# Patient Record
Sex: FEMALE
[De-identification: names, ages, dates, MRNs, and addresses within clinical notes are randomized per-mention and may not be internally consistent; named-entity substitution may affect disease eponyms.]

---

## 2019-09-12 ENCOUNTER — HOSPITAL ENCOUNTER (EMERGENCY)
Dept: HOSPITAL 5 - ED | Age: 34
LOS: 1 days | Discharge: HOME | End: 2019-09-13
Payer: SELF-PAY

## 2019-09-12 DIAGNOSIS — S63.502A: Primary | ICD-10-CM
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
## 2019-09-12 DIAGNOSIS — F12.10: ICD-10-CM
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
## 2019-09-12 DIAGNOSIS — Z98.890: ICD-10-CM

## 2019-09-12 DIAGNOSIS — S50.12XA: ICD-10-CM

## 2019-09-12 DIAGNOSIS — F14.10: ICD-10-CM

## 2019-09-12 DIAGNOSIS — F17.200: ICD-10-CM
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
## 2019-09-13 VITALS — DIASTOLIC BLOOD PRESSURE: 76 MMHG | SYSTOLIC BLOOD PRESSURE: 118 MMHG

## 2019-09-13 NOTE — EMERGENCY DEPARTMENT REPORT
ED Fall HPI





- General


Chief Complaint: Extremity Injury, Upper


Stated Complaint: LEFT WRIST PAIN


Source: patient


Mode of arrival: Ambulatory





- History of Present Illness


Initial Comments: 





Patient is a 34-year-old white female with no past medical history presents to 

the ED with component of acute onset persistent severe left wrist pain after she

tripped the house and fell down landing on the left wrist about 4 hours ago.  

Patient states that pain is worse with any active range of motion of the left 

wrist.  Patient denies head or neck injury, chest pain, shortness of breath, 

numbness and tingling of left arm, abdominal pain, low back pain, dizziness, 

syncope or seizures.


MD Complaint: fall, other (left wrist pain)


-: Sudden, hour(s) (4)


Fall From: standing, other (slipped and fell down on floor)


When Fall Occurred: 1-3 hours PTA


Fall Witnessed: yes, by bystander


Place Fall Occurred: home


Loss of Consciousness: none


Prolonged Down Time?: no


Symptoms Prior to Fall: none, other (tripped)


Location: other (left wrist)


Location - Extremities: Left: Forearm (left forearm), Hand (left wrist)


Severity: severe


Severity scale (0 -10): 6


Quality: sharp, aching


Context: tripped/slipped


Associated Symptoms: denies: headache, neck pain, numbness, chest paint, 

shortness of breath, abdominal pain, hematuria, unable to walk, lightheaded, 

vertigo, confusion, other





- Related Data


                                  Previous Rx's











 Medication  Instructions  Recorded  Last Taken  Type


 


Cyclobenzaprine HCl [Flexeril 5 MG 5 mg PO Q8H PRN #15 tab 09/13/19 Unknown Rx





TAB]    


 


Naproxen [Naprosyn TAB] 375 mg PO Q12H PRN #20 tablet 09/13/19 Unknown Rx














ED Review of Systems


ROS: 


Stated complaint: LEFT WRIST PAIN


Other details as noted in HPI





Constitutional: denies: chills, fever


Eyes: denies: eye pain, eye discharge, vision change


ENT: denies: ear pain, throat pain


Respiratory: denies: cough, shortness of breath, wheezing


Cardiovascular: denies: chest pain, palpitations


Endocrine: no symptoms reported


Gastrointestinal: denies: abdominal pain, nausea, diarrhea


Genitourinary: denies: urgency, dysuria, discharge


Musculoskeletal: arthralgia (LEFT WRIST).  denies: back pain, joint swelling


Skin: denies: rash, lesions


Neurological: denies: headache, weakness, paresthesias


Psychiatric: denies: anxiety, depression


Hematological/Lymphatic: denies: easy bleeding, easy bruising





ED Past Medical Hx





- Past Medical History


Previous Medical History?: No





- Surgical History


Past Surgical History?: Yes


Additional Surgical History: c-Sec X2





- Social History


Smoking Status: Current Every Day Smoker


Substance Use Type: Cocaine, Marijuana





- Medications


Home Medications: 


                                Home Medications











 Medication  Instructions  Recorded  Confirmed  Last Taken  Type


 


Cyclobenzaprine HCl [Flexeril 5 MG 5 mg PO Q8H PRN #15 tab 09/13/19  Unknown Rx





TAB]     


 


Naproxen [Naprosyn TAB] 375 mg PO Q12H PRN #20 tablet 09/13/19  Unknown Rx














ED Physical Exam





- General


Limitations: No Limitations


General appearance: alert, in no apparent distress





- Head


Head exam: Present: atraumatic, normocephalic, normal inspection





- Eye


Eye exam: Present: normal appearance, PERRL, EOMI


Pupils: Present: normal accommodation





- ENT


ENT exam: Present: normal exam, normal orophraynx, mucous membranes moist, TM's 

normal bilaterally, normal external ear exam





- Neck


Neck exam: Present: normal inspection, full ROM





- Respiratory


Respiratory exam: Present: normal lung sounds bilaterally.  Absent: respiratory 

distress, wheezes, rales, rhonchi, chest wall tenderness, decreased breath 

sounds





- Cardiovascular


Cardiovascular Exam: Present: regular rate, normal rhythm, normal heart sounds. 

Absent: systolic murmur, diastolic murmur, rubs, gallop





- GI/Abdominal


GI/Abdominal exam: Present: soft, normal bowel sounds.  Absent: distended, ten

derness, guarding, rebound, hyperactive bowel sounds, hypoactive bowel sounds, 

organomegaly





- Extremities Exam


Extremities exam: Present: normal inspection, full ROM, tenderness (Left wrist 

tenderness), normal capillary refill





- Back Exam


Back exam: Present: normal inspection, full ROM.  Absent: tenderness, CVA 

tenderness (R), CVA tenderness (L), muscle spasm, paraspinal tenderness





- Neurological Exam


Neurological exam: Present: alert, oriented X3, CN II-XII intact, normal gait, 

reflexes normal





- Psychiatric


Psychiatric exam: Present: normal affect, normal mood





- Skin


Skin exam: Present: warm, dry, intact, normal color.  Absent: rash





ED Course





                                   Vital Signs











  09/13/19





  00:03


 


Temperature 98.2 F


 


Pulse Rate 91 H


 


Respiratory 16





Rate 


 


Blood Pressure 105/64


 


O2 Sat by Pulse 98





Oximetry 














- Reevaluation(s)


Reevaluation #1: 





09/13/19 04:48


This is a 34-year-old female who presented to the ED with left wrist pain after 

she tripped and fell down on the floor about 4 hours ago.  In the ED, patient is

alert and oriented 3 and is not in distress but appears to be in pain.  Vital 

signs are stable.  Left wrist x-ray shows no acute fractures or subluxations.  

Patient was treated for pain and the left wrist splinted with a Velcro splint.  

Patient was discharged home on pain medications and muscle relaxants and advised

to follow-up with her primary care physician in 5-7 days for reevaluation.  

Patient was also advised to return to the ED immediately if symptoms get worse.





ED Medical Decision Making





- Radiology Data





Left wrist x-ray shows no acute fractures or subluxations.





- Medical Decision Making





This is a 34-year-old female who presented to the ED with left wrist pain after 

she tripped and fell down on the floor about 4 hours ago.  In the ED, patient is

alert and oriented 3 and is not in distress but appears to be in pain.  Vital 

signs are stable.  Left wrist x-ray shows no acute fractures or subluxations.  

Patient was treated for pain and the left wrist splinted with a Velcro splint.  

Patient was discharged home on pain medications and muscle relaxants and advised

to follow-up with her primary care physician in 5-7 days for reevaluation.  

Patient was also advised to return to the ED immediately if symptoms get worse.





- Differential Diagnosis


wrist fracture; lwrist sprain; forearm contusion


Critical care attestation.: 


If time is entered above; I have spent that time in minutes in the direct care 

of this critically ill patient, excluding procedure time.








ED Disposition


Clinical Impression: 


Sprain of wrist, left


Qualifiers:


 Encounter type: initial encounter Qualified Code(s): S63.502A - Unspecified 

sprain of left wrist, initial encounter





Contusion of left forearm


Qualifiers:


 Encounter type: initial encounter Qualified Code(s): S50.12XA - Contusion of 

left forearm, initial encounter





Disposition: DC-01 TO HOME OR SELFCARE


Is pt being admited?: No


Does the pt Need Aspirin: No


Condition: Stable


Instructions:  Wrist Injury (ED), Wrist Sprain (ED), Muscle Strain (ED)


Additional Instructions: 


Take medications with food, drink plenty of fluids and follow up with your 

Primary care Physician in 5-7 days for further reevaluation. Return to the ED 

immediately if symptoms get worse.


Prescriptions: 


Cyclobenzaprine HCl [Flexeril 5 MG TAB] 5 mg PO Q8H PRN #15 tab


 PRN Reason: Muscle Spasm


Naproxen [Naprosyn TAB] 375 mg PO Q12H PRN #20 tablet


 PRN Reason: Pain , Severe (7-10)


Referrals: 


PRIMARY CARE,MD [Primary Care Provider] - 3-5 Days


Time of Disposition: 04:25


Print Language: ENGLISH

## 2019-09-16 NOTE — XRAY REPORT
Left wrist, 4 views



INDICATION:  fall - left wrist pain. 



COMPARISON: None.



IMPRESSION:  No acute osseous or soft tissue abnormality.    No significant DJD.



Signer Name: Gabo Ghotra Jr, MD 

Signed: 9/16/2019 2:16 PM

 Workstation Name: [id number]

## 2019-10-01 ENCOUNTER — HOSPITAL ENCOUNTER (EMERGENCY)
Dept: HOSPITAL 5 - ED | Age: 34
Discharge: LEFT BEFORE DISCHARGE | End: 2019-10-01
Payer: SELF-PAY

## 2019-10-01 VITALS — DIASTOLIC BLOOD PRESSURE: 74 MMHG | SYSTOLIC BLOOD PRESSURE: 103 MMHG

## 2019-10-01 DIAGNOSIS — K62.89: Primary | ICD-10-CM

## 2019-10-01 DIAGNOSIS — Z53.21: ICD-10-CM

## 2019-10-01 NOTE — EVENT NOTE
ED Screening Note


Date of service: 10/01/19


Time: 21:53


ED Screening Note: 


34 y o f presents to Ed cc of rectal pain x 1 hour ago


sharp pain intermittent.


no blood in stool





This initial assessment/diagnostic orders/clinical plan/treatment(s) is/are 

subject to change based on patients health status, clinical progression and re-

assessment by fellow clinical providers in the ED. Further treatment and workup 

at subsequent clinical providers discretion. Patient/guardian urged not to elope

from the ED as their condition may be serious if not clinically assessed and 

managed. 





Initial orders include: 


acc eval

## 2019-10-02 ENCOUNTER — HOSPITAL ENCOUNTER (EMERGENCY)
Dept: HOSPITAL 5 - ED | Age: 34
Discharge: HOME | End: 2019-10-02
Payer: SELF-PAY

## 2019-10-02 VITALS — DIASTOLIC BLOOD PRESSURE: 77 MMHG | SYSTOLIC BLOOD PRESSURE: 109 MMHG

## 2019-10-02 DIAGNOSIS — F17.200: ICD-10-CM

## 2019-10-02 DIAGNOSIS — F12.10: ICD-10-CM

## 2019-10-02 DIAGNOSIS — F14.10: ICD-10-CM

## 2019-10-02 DIAGNOSIS — K59.00: Primary | ICD-10-CM

## 2019-10-02 PROCEDURE — 99281 EMR DPT VST MAYX REQ PHY/QHP: CPT

## 2019-10-02 NOTE — EMERGENCY DEPARTMENT REPORT
ED General Adult HPI





- General


Chief complaint: Rectal Pain


Stated complaint: COLON/RECTAL PAIN


Time Seen by Provider: 10/02/19 18:54


Source: patient


Mode of arrival: Ambulatory


Limitations: No Limitations





- History of Present Illness


Initial comments: 





Patient is a 34-year-old white female with no past medical history presents to 

the ED with complaint of acute onset persistent severe rectal pain for the last 

2 days.  Patient admits to having rectal intercourse with no protection, the 

last episode of which was 3 days ago.  Patient denies vaginal bleeding, 

hematochezia, dysuria, dizziness, fever, chills, abdominal pain, nausea and 

vomiting.


MD Complaint: Rectal pain


-: Sudden, days(s) (3)


Location: abdomen (rectum)


Radiation: non-radiation


Severity scale (0 -10): 1


Quality: burning, aching, sharp


Consistency: intermittent


Improves with: none


Worsens with: none


Associated Symptoms: denies other symptoms.  denies: confusion, chest pain, 

cough, diaphoresis, fever/chills, headaches, loss of appetite, nausea/vomiting, 

shortness of breath, weakness


Treatments Prior to Arrival: none





- Related Data


                                  Previous Rx's











 Medication  Instructions  Recorded  Last Taken  Type


 


Cyclobenzaprine HCl [Flexeril 5 MG 5 mg PO Q8H PRN #15 tab 09/13/19 Unknown Rx





TAB]    


 


Naproxen [Naprosyn TAB] 375 mg PO Q12H PRN #20 tablet 09/13/19 Unknown Rx


 


Docusate Sodium [Colace CAP] 100 mg PO BID PRN #30 capsule 10/02/19 Unknown Rx


 


Ibuprofen [Motrin] 400 mg PO Q8H PRN #20 tablet 10/02/19 Unknown Rx











                                    Allergies











Allergy/AdvReac Type Severity Reaction Status Date / Time


 


No Known Allergies Allergy   Unverified 10/01/19 21:54














ED Review of Systems


ROS: 


Stated complaint: COLON/RECTAL PAIN


Other details as noted in HPI





Constitutional: denies: chills, fever


Eyes: denies: eye pain, eye discharge, vision change


ENT: denies: ear pain, throat pain


Respiratory: denies: cough, shortness of breath, wheezing


Cardiovascular: denies: chest pain, palpitations


Endocrine: no symptoms reported


Gastrointestinal: other (RECTAL PAIN).  denies: abdominal pain, nausea, 

vomiting, diarrhea, hematemesis


Genitourinary: denies: urgency, dysuria, discharge


Musculoskeletal: denies: back pain, joint swelling, arthralgia


Skin: denies: rash, lesions


Neurological: denies: headache, weakness, paresthesias


Psychiatric: denies: anxiety, depression


Hematological/Lymphatic: denies: easy bleeding, easy bruising





ED Past Medical Hx





- Past Medical History


Previous Medical History?: No





- Surgical History


Additional Surgical History: c-Sec X2





- Social History


Smoking Status: Current Every Day Smoker


Substance Use Type: Alcohol, Cocaine, Marijuana, Methamphetamines





- Medications


Home Medications: 


                                Home Medications











 Medication  Instructions  Recorded  Confirmed  Last Taken  Type


 


Cyclobenzaprine HCl [Flexeril 5 MG 5 mg PO Q8H PRN #15 tab 09/13/19  Unknown Rx





TAB]     


 


Naproxen [Naprosyn TAB] 375 mg PO Q12H PRN #20 tablet 09/13/19  Unknown Rx


 


Docusate Sodium [Colace CAP] 100 mg PO BID PRN #30 capsule 10/02/19  Unknown Rx


 


Ibuprofen [Motrin] 400 mg PO Q8H PRN #20 tablet 10/02/19  Unknown Rx














ED Physical Exam





- General


Limitations: No Limitations


General appearance: alert, in no apparent distress





- Head


Head exam: Present: atraumatic, normocephalic, normal inspection





- Eye


Eye exam: Present: normal appearance, PERRL, EOMI.  Absent: scleral icterus, 

conjunctival injection, nystagmus, periorbital swelling, periorbital tenderness


Pupils: Present: normal accommodation





- ENT


ENT exam: Present: normal exam, normal orophraynx, mucous membranes moist, TM's 

normal bilaterally, normal external ear exam





- Neck


Neck exam: Present: normal inspection, full ROM





- Respiratory


Respiratory exam: Present: normal lung sounds bilaterally.  Absent: respiratory 

distress, wheezes, rhonchi, stridor, chest wall tenderness, decreased breath 

sounds





- Cardiovascular


Cardiovascular Exam: Present: regular rate, normal rhythm, normal heart sounds. 

 Absent: systolic murmur, diastolic murmur, rubs, gallop





- GI/Abdominal


GI/Abdominal exam: Present: soft, normal bowel sounds.  Absent: tenderness, 

guarding, hyperactive bowel sounds, organomegaly, mass





- Rectal


Rectal exam: Present: normal inspection, normal rectal tone, fecal impaction.  

Absent: hemorrhoids, tenderness





- Extremities Exam


Extremities exam: Present: normal inspection, full ROM, normal capillary refill





- Back Exam


Back exam: Present: normal inspection, full ROM.  Absent: tenderness, CVA 

tenderness (R), CVA tenderness (L), muscle spasm, paraspinal tenderness





- Neurological Exam


Neurological exam: Present: alert, oriented X3, CN II-XII intact, normal gait, 

reflexes normal





- Psychiatric


Psychiatric exam: Present: normal affect, normal mood





- Skin


Skin exam: Present: warm, dry, intact, normal color.  Absent: rash





ED Course





                                   Vital Signs











  10/02/19





  18:54


 


Temperature 98 F


 


Pulse Rate 68


 


Respiratory 18





Rate 


 


Blood Pressure 109/77


 


O2 Sat by Pulse 98





Oximetry 














- Reevaluation(s)


Reevaluation #1: 





10/02/19 20:49


This is a 34-year-old white female with no past medical history who presented to

 the ED with acute onset persistent rectal pain for 2 days.  Patient is alert 

and oriented 3 and is not in distress.  Patient was discharged home on 

ibuprofen and Colace to treat rectal pain and constipation respectively.  

Patient was advised to follow-up with her primary care physician in 7-10 days 

for reevaluation or return to the ED immediately if symptoms get worse.





ED Medical Decision Making





- Medical Decision Making





This is a 34-year-old white female with no past medical history who presented to

 the ED with acute onset persistent rectal pain for 2 days.  Patient is alert 

and oriented 3 and is not in distress.  Patient was discharged home on 

ibuprofen and Colace to treat rectal pain and constipation respectively.  

Patient was advised to follow-up with her primary care physician in 7-10 days fo

r reevaluation or return to the ED immediately if symptoms get worse.





- Differential Diagnosis


RECTAL PAIN; HEMORRHOIDS; CONSTIPATION; RECTAL TEAR


Critical care attestation.: 


If time is entered above; I have spent that time in minutes in the direct care 

of this critically ill patient, excluding procedure time.








ED Disposition


Clinical Impression: 


 Anal or rectal pain





Constipation


Qualifiers:


 Constipation type: unspecified constipation type Qualified Code(s): K59.00 - 

Constipation, unspecified





Disposition: DC-01 TO HOME OR SELFCARE


Is pt being admited?: No


Does the pt Need Aspirin: No


Condition: Stable


Instructions:  Constipation (ED)


Additional Instructions: 


Drink plenty of fluids and follow-up with your primary care physician in 7-10 

days for reevaluation.  Return to the ED immediately if symptoms worse.


Prescriptions: 


Docusate Sodium [Colace CAP] 100 mg PO BID PRN #30 capsule


 PRN Reason: Constipation


Ibuprofen [Motrin] 400 mg PO Q8H PRN #20 tablet


 PRN Reason: Pain , Severe (7-10)


Referrals: 


PRIMARY CARE,MD [Primary Care Provider] - 3-5 Days


Time of Disposition: 20:46


Print Language: ENGLISH

## 2019-10-02 NOTE — EVENT NOTE
ED Screening Note


Date of service: 10/02/19


Time: 18:54


ED Screening Note: 


34 y o female presents to ED  cc of rectal pain


pt was here yesterday but left due to the wait time without being seen





This initial assessment/diagnostic orders/clinical plan/treatment(s) is/are 

subject to change based on patients health status, clinical progression and re-

assessment by fellow clinical providers in the ED. Further treatment and workup 

at subsequent clinical providers discretion. Patient/guardian urged not to elope

from the ED as their condition may be serious if not clinically assessed and 

managed. 





Initial orders include: 


acc eval

## 2021-09-11 ENCOUNTER — HOSPITAL ENCOUNTER (EMERGENCY)
Dept: HOSPITAL 5 - ED | Age: 36
Discharge: HOME | End: 2021-09-11
Payer: SELF-PAY

## 2021-09-11 VITALS — SYSTOLIC BLOOD PRESSURE: 131 MMHG | DIASTOLIC BLOOD PRESSURE: 94 MMHG

## 2021-09-11 DIAGNOSIS — Z98.890: ICD-10-CM

## 2021-09-11 DIAGNOSIS — F12.90: ICD-10-CM

## 2021-09-11 DIAGNOSIS — U07.1: Primary | ICD-10-CM

## 2021-09-11 DIAGNOSIS — F14.90: ICD-10-CM

## 2021-09-11 DIAGNOSIS — F15.90: ICD-10-CM

## 2021-09-11 DIAGNOSIS — Z79.899: ICD-10-CM

## 2021-09-11 DIAGNOSIS — F17.200: ICD-10-CM

## 2021-09-11 DIAGNOSIS — R06.02: ICD-10-CM

## 2021-09-11 LAB
ALBUMIN SERPL-MCNC: 3.5 G/DL (ref 3.9–5)
ALT SERPL-CCNC: 10 UNITS/L (ref 7–56)
BASOPHILS # (AUTO): 0 K/MM3 (ref 0–0.1)
BASOPHILS NFR BLD AUTO: 0.3 % (ref 0–1.8)
BUN SERPL-MCNC: 7 MG/DL (ref 7–17)
BUN/CREAT SERPL: 18 %
CALCIUM SERPL-MCNC: 8.5 MG/DL (ref 8.4–10.2)
EOSINOPHIL # BLD AUTO: 0.1 K/MM3 (ref 0–0.4)
EOSINOPHIL NFR BLD AUTO: 1.2 % (ref 0–4.3)
HCT VFR BLD CALC: 37.1 % (ref 30.3–42.9)
HEMOLYSIS INDEX: 6
HGB BLD-MCNC: 13 GM/DL (ref 10.1–14.3)
LYMPHOCYTES # BLD AUTO: 1.6 K/MM3 (ref 1.2–5.4)
LYMPHOCYTES NFR BLD AUTO: 27.9 % (ref 13.4–35)
MCHC RBC AUTO-ENTMCNC: 35 % (ref 30–34)
MCV RBC AUTO: 93 FL (ref 79–97)
MONOCYTES # (AUTO): 0.4 K/MM3 (ref 0–0.8)
MONOCYTES % (AUTO): 7.4 % (ref 0–7.3)
PLATELET # BLD: 260 K/MM3 (ref 140–440)
RBC # BLD AUTO: 3.98 M/MM3 (ref 3.65–5.03)

## 2021-09-11 PROCEDURE — 85379 FIBRIN DEGRADATION QUANT: CPT

## 2021-09-11 PROCEDURE — 84703 CHORIONIC GONADOTROPIN ASSAY: CPT

## 2021-09-11 PROCEDURE — 80053 COMPREHEN METABOLIC PANEL: CPT

## 2021-09-11 PROCEDURE — 85025 COMPLETE CBC W/AUTO DIFF WBC: CPT

## 2021-09-11 PROCEDURE — 36415 COLL VENOUS BLD VENIPUNCTURE: CPT

## 2021-09-11 PROCEDURE — 93005 ELECTROCARDIOGRAM TRACING: CPT

## 2021-09-11 PROCEDURE — 84484 ASSAY OF TROPONIN QUANT: CPT

## 2021-09-11 PROCEDURE — 71275 CT ANGIOGRAPHY CHEST: CPT

## 2021-09-11 PROCEDURE — 99284 EMERGENCY DEPT VISIT MOD MDM: CPT

## 2021-09-11 PROCEDURE — 83880 ASSAY OF NATRIURETIC PEPTIDE: CPT

## 2021-09-11 PROCEDURE — 71046 X-RAY EXAM CHEST 2 VIEWS: CPT

## 2021-09-11 NOTE — CAT SCAN REPORT
CTA CHEST WITH IV CONTRAST



INDICATION / CLINICAL INFORMATION:

SOB, elevated d-dimer.



TECHNIQUE:

Axial CT images were obtained through the chest after injection of 75 IV contrast. 3 plane MIP and/or
 3D reconstructions were produced. All CT scans at this location are performed using CT dose reductio
n for ALARA by means of automated exposure control. 



COMPARISON:

Chest radiograph earlier today.



FINDINGS:

PULMONARY ARTERIES: No pulmonary emboli.

THORACIC AORTA: No significant abnormality. 

HEART: No significant abnormality.

CORONARY ARTERIES: No significant calcification.

PLEURA: No pleural effusion. No pneumothorax.

LYMPH NODES: No significant adenopathy.

LUNGS: No acute air space or interstitial disease. 



ADDITIONAL FINDINGS: None.



UPPER ABDOMEN: No acute findings.



SKELETAL STRUCTURES: No significant osseous abnormality.



IMPRESSION:

1. No CT evidence for pulmonary embolism. 

2. No acute findings.



Signer Name: Demetrice Malave MD 

Signed: 9/11/2021 1:41 PM

Workstation Name: [id number]

## 2021-09-11 NOTE — XRAY REPORT
CHEST 2 VIEWS 



INDICATION / CLINICAL INFORMATION:

SOB.



COMPARISON: 

None available.



FINDINGS:



SUPPORT DEVICES: None.



HEART / MEDIASTINUM: No significant abnormality. 



LUNGS / PLEURA: No significant pulmonary or pleural abnormality. No pneumothorax. 



ADDITIONAL FINDINGS: No significant additional findings.



IMPRESSION:

1. No acute findings.



Signer Name: Demetrice Malave MD 

Signed: 9/11/2021 11:57 AM

Workstation Name: [id number]

## 2021-09-11 NOTE — EMERGENCY DEPARTMENT REPORT
ED General Adult HPI





- General


Chief complaint: Extremity Injury, Upper


Stated complaint: SOB DIZZINESS NUMBNESS


Time Seen by Provider: 09/11/21 10:11


Source: patient


Mode of arrival: Ambulatory


Limitations: No Limitations





- History of Present Illness


Initial comments: 





Patient is a 36-year-old female presents emergency room with complaints of 

shortness of breath that initially began a couple days ago but has worsened 

since last night.  Patient states that she had a fall and someone fell on her 

arm a couple days ago and she had a fracture of her humerus and was seen at 

[facility] at that time and placed in a sling and given orthopedic follow-up

.  She states that she has not yet seen orthopedic doctor.  She states that she 

was also having URI symptoms at that time and they tested her at [facility] 

and she was positive for COVID-19.  She states her symptoms have been 

lightheadedness, shortness of breath, cough, diarrhea, subjective fever, chills,

body aches, one episode of vomiting.  She is able to tolerate p.o. intake.  No 

other past medical history.  No allergies to medications.  She denies any recent

surgery or recent travel.  She is a smoker.





- Related Data


                                  Previous Rx's











 Medication  Instructions  Recorded  Last Taken  Type


 


Cyclobenzaprine HCl [Flexeril 5 MG 5 mg PO Q8H PRN #15 tab 09/13/19 Unknown Rx





TAB]    


 


Naproxen [Naprosyn TAB] 375 mg PO Q12H PRN #20 tablet 09/13/19 Unknown Rx


 


Docusate Sodium [Colace CAP] 100 mg PO BID PRN #30 capsule 10/02/19 Unknown Rx


 


Ibuprofen [Motrin] 400 mg PO Q8H PRN #20 tablet 10/02/19 Unknown Rx











                                    Allergies











Allergy/AdvReac Type Severity Reaction Status Date / Time


 


No Known Allergies Allergy   Unverified 10/01/19 21:54














ED Review of Systems


ROS: 


Stated complaint: SOB DIZZINESS NUMBNESS


Other details as noted in HPI





Comment: All other systems reviewed and negative





ED Past Medical Hx





- Past Medical History


Previous Medical History?: No





- Surgical History


Past Surgical History?: Yes


Additional Surgical History: c-Sec X2





- Social History


Smoking Status: Current Every Day Smoker


Substance Use Type: Alcohol, Cocaine, Marijuana, Methamphetamines





- Medications


Home Medications: 


                                Home Medications











 Medication  Instructions  Recorded  Confirmed  Last Taken  Type


 


Cyclobenzaprine HCl [Flexeril 5 MG 5 mg PO Q8H PRN #15 tab 09/13/19  Unknown Rx





TAB]     


 


Naproxen [Naprosyn TAB] 375 mg PO Q12H PRN #20 tablet 09/13/19  Unknown Rx


 


Docusate Sodium [Colace CAP] 100 mg PO BID PRN #30 capsule 10/02/19  Unknown Rx


 


Ibuprofen [Motrin] 400 mg PO Q8H PRN #20 tablet 10/02/19  Unknown Rx














ED Physical Exam





- General


Limitations: No Limitations


General appearance: alert, in no apparent distress





- Head


Head exam: Present: atraumatic, normocephalic





- Eye


Eye exam: Present: normal appearance





- ENT


ENT exam: Present: mucous membranes moist





- Respiratory


Respiratory exam: Present: normal lung sounds bilaterally.  Absent: respiratory 

distress, wheezes, rales, rhonchi, stridor, chest wall tenderness, accessory 

muscle use, decreased breath sounds, prolonged expiratory





- Cardiovascular


Cardiovascular Exam: Present: regular rate, normal rhythm, normal heart sounds. 

 Absent: systolic murmur, diastolic murmur, rubs, gallop





- Extremities Exam


Extremities exam: Present: other (ecchymosis and ttp to the right humerus, pt 

has sling, neurovascularly intact)





- Neurological Exam


Neurological exam: Present: alert, oriented X3





- Psychiatric


Psychiatric exam: Present: normal affect, normal mood





- Skin


Skin exam: Present: warm, dry





ED Course


                                   Vital Signs











  09/11/21 09/11/21





  06:33 13:53


 


Temperature 97.8 F 


 


Pulse Rate 86 77


 


Respiratory 16 





Rate  


 


Blood Pressure 131/94 


 


O2 Sat by Pulse 97 100





Oximetry  














ED Medical Decision Making





- Lab Data


Result diagrams: 


                                 09/11/21 10:37





                                 09/11/21 10:37








                                   Lab Results











  09/11/21 09/11/21 09/11/21 Range/Units





  10:37 10:37 10:37 


 


WBC  6.0    (4.5-11.0)  K/mm3


 


RBC  3.98    (3.65-5.03)  M/mm3


 


Hgb  13.0    (10.1-14.3)  gm/dl


 


Hct  37.1    (30.3-42.9)  %


 


MCV  93    (79-97)  fl


 


MCH  33 H    (28-32)  pg


 


MCHC  35 H    (30-34)  %


 


RDW  14.7    (13.2-15.2)  %


 


Plt Count  260    (140-440)  K/mm3


 


Lymph % (Auto)  27.9    (13.4-35.0)  %


 


Mono % (Auto)  7.4 H    (0.0-7.3)  %


 


Eos % (Auto)  1.2    (0.0-4.3)  %


 


Baso % (Auto)  0.3    (0.0-1.8)  %


 


Lymph # (Auto)  1.6    (1.2-5.4)  K/mm3


 


Mono # (Auto)  0.4    (0.0-0.8)  K/mm3


 


Eos # (Auto)  0.1    (0.0-0.4)  K/mm3


 


Baso # (Auto)  0.0    (0.0-0.1)  K/mm3


 


Seg Neutrophils %  63.2    (40.0-70.0)  %


 


Seg Neutrophils #  3.7    (1.8-7.7)  K/mm3


 


D-Dimer   641.67 H   (0-234)  ng/mlDDU


 


Sodium    139  (137-145)  mmol/L


 


Potassium    4.0  (3.6-5.0)  mmol/L


 


Chloride    102.1  ()  mmol/L


 


Carbon Dioxide    31 H  (22-30)  mmol/L


 


Anion Gap    10  mmol/L


 


BUN    7  (7-17)  mg/dL


 


Creatinine    0.4 L  (0.6-1.2)  mg/dL


 


Estimated GFR    > 60  ml/min


 


BUN/Creatinine Ratio    18  %


 


Glucose    95  ()  mg/dL


 


Calcium    8.5  (8.4-10.2)  mg/dL


 


Total Bilirubin    0.20  (0.1-1.2)  mg/dL


 


AST    18  (5-40)  units/L


 


ALT    10  (7-56)  units/L


 


Alkaline Phosphatase    83  ()  units/L


 


Troponin T    < 0.010  (0.00-0.029)  ng/mL


 


NT-Pro-B Natriuret Pep    92.55  (0-450)  pg/mL


 


Total Protein    6.9  (6.3-8.2)  g/dL


 


Albumin    3.5 L  (3.9-5)  g/dL


 


Albumin/Globulin Ratio    1.0  %


 


HCG, Qual     (Negative)  














  09/11/21 Range/Units





  10:37 


 


WBC   (4.5-11.0)  K/mm3


 


RBC   (3.65-5.03)  M/mm3


 


Hgb   (10.1-14.3)  gm/dl


 


Hct   (30.3-42.9)  %


 


MCV   (79-97)  fl


 


MCH   (28-32)  pg


 


MCHC   (30-34)  %


 


RDW   (13.2-15.2)  %


 


Plt Count   (140-440)  K/mm3


 


Lymph % (Auto)   (13.4-35.0)  %


 


Mono % (Auto)   (0.0-7.3)  %


 


Eos % (Auto)   (0.0-4.3)  %


 


Baso % (Auto)   (0.0-1.8)  %


 


Lymph # (Auto)   (1.2-5.4)  K/mm3


 


Mono # (Auto)   (0.0-0.8)  K/mm3


 


Eos # (Auto)   (0.0-0.4)  K/mm3


 


Baso # (Auto)   (0.0-0.1)  K/mm3


 


Seg Neutrophils %   (40.0-70.0)  %


 


Seg Neutrophils #   (1.8-7.7)  K/mm3


 


D-Dimer   (0-234)  ng/mlDDU


 


Sodium   (137-145)  mmol/L


 


Potassium   (3.6-5.0)  mmol/L


 


Chloride   ()  mmol/L


 


Carbon Dioxide   (22-30)  mmol/L


 


Anion Gap   mmol/L


 


BUN   (7-17)  mg/dL


 


Creatinine   (0.6-1.2)  mg/dL


 


Estimated GFR   ml/min


 


BUN/Creatinine Ratio   %


 


Glucose   ()  mg/dL


 


Calcium   (8.4-10.2)  mg/dL


 


Total Bilirubin   (0.1-1.2)  mg/dL


 


AST   (5-40)  units/L


 


ALT   (7-56)  units/L


 


Alkaline Phosphatase   ()  units/L


 


Troponin T   (0.00-0.029)  ng/mL


 


NT-Pro-B Natriuret Pep   (0-450)  pg/mL


 


Total Protein   (6.3-8.2)  g/dL


 


Albumin   (3.9-5)  g/dL


 


Albumin/Globulin Ratio   %


 


HCG, Qual  Negative  (Negative)  














- EKG Data


EKG shows normal: sinus rhythm, axis, intervals, QRS complexes


Rate: normal





- EKG Data





09/11/21 13:49


Nonspecific T wave inversion V1 V2


No STEMI





- Radiology Data


Radiology results: report reviewed





Ordering Physician: ARLEEN MOYA  


Date of Service: 09/11/21  


Procedure(s): XR chest routine 2V  


Accession Number(s): [id number]  


 


cc: ARLEEN MOYA   


 


Fluoro Time In Minutes:   


 


CHEST 2 VIEWS   


 


 INDICATION / CLINICAL INFORMATION:  


 SOB.  


 


 COMPARISON:   


 None available.  


 


 FINDINGS:  


 


 SUPPORT DEVICES: None.  


 


 HEART / MEDIASTINUM: No significant abnormality.   


 


 LUNGS / PLEURA: No significant pulmonary or pleural abnormality. No 

pneumothorax.   


 


 ADDITIONAL FINDINGS: No significant additional findings.  


 


 IMPRESSION:  


 1. No acute findings.  


 


 Signer Name: Demetrice Malave MD   


 Signed: 9/11/2021 11:57 AM  


 Workstation Name: [id number]   


 


 


Transcribed By:   


Dictated By: Demetrice Malave MD  


Electronically Authenticated By: Demetrice Malave MD    


Signed Date/Time: 09/11/21 1157                                


 


 


 


DD/DT: 09/11/21 1156                                                            

  


TD/TT:











Ordering Physician: ARLEEN MOYA  


Date of Service: 09/11/21  


Procedure(s): CT angio chest  


Accession Number(s): [id number]  


 


cc: ARLEEN MOYA   


 


 


CTA CHEST WITH IV CONTRAST  


 


 INDICATION / CLINICAL INFORMATION:  


 SOB, elevated d-dimer.  


 


 TECHNIQUE:  


 Axial CT images were obtained through the chest after injection of 75 IV 

contrast. 3 plane MIP 


and/or 3D reconstructions were produced. All CT scans at this location are 

performed using CT dose 


reduction for ALARA by means of automated exposure control.   


 


 COMPARISON:  


 Chest radiograph earlier today.  


 


 FINDINGS:  


 PULMONARY ARTERIES: No pulmonary emboli.  


 THORACIC AORTA: No significant abnormality.   


 HEART: No significant abnormality.  


 CORONARY ARTERIES: No significant calcification.  


 PLEURA: No pleural effusion. No pneumothorax.  


 LYMPH NODES: No significant adenopathy.  


 LUNGS: No acute air space or interstitial disease.   


 


 ADDITIONAL FINDINGS: None.  


 


 UPPER ABDOMEN: No acute findings.  


 


 SKELETAL STRUCTURES: No significant osseous abnormality.  


 


 IMPRESSION:  


 1. No CT evidence for pulmonary embolism.   


 2. No acute findings.  


 


 Signer Name: Demetrice Malave MD   


 Signed: 9/11/2021 1:41 PM  


 Workstation Name: [id number]   


 


 


Transcribed By:   


Dictated By: Demetrice Malave MD  


Electronically Authenticated By: Demetrice Malave MD    


Signed Date/Time: 09/11/21 1341                                


 


 


 


DD/DT: 09/11/21 1337                                                            

  


TD/TT:












































- Medical Decision Making





Patient is a 36-year-old female presents emergency room with complaints of 

shortness of breath that initially began a couple days ago but has worsened 

since last night.  Patient states that she had a fall and someone fell on her 

arm a couple days ago and she had a fracture of her humerus and was seen at 

[facility] at that time and placed in a sling and given orthopedic follow-

up.  She states that she has not yet seen orthopedic doctor.  She states that 

she was also having URI symptoms at that time and they tested her at [facility] and she was positive for COVID-19.  She states her symptoms have been 

lightheadedness, shortness of breath, cough, diarrhea, subjective fever, chills,

 body aches, one episode of vomiting.  She is able to tolerate p.o. intake.  No 

other past medical history.  No allergies to medications.  She denies any recent

 surgery or recent travel.  She is a smoker.  Vitals are normal.  On 

exam:ecchymosis and ttp to the right humerus, pt has sling, neurovascularly 

intact. EKG with nonspecific T wave inversion in V1 and V2, no STEMI.  Chest x-

ray: 1. No acute findings.  Lab significant for elevated D-dimer, otherwise 

stable.  CT angio chest: 1. No CT evidence for pulmonary embolism.   2. No acute

 findings.  Discussed all results with patient and answered questions.  Advised 

patient Please follow-up with your primary care doctor.  Please follow-up with 

the orthopedic doctor you were given by [facility].  Return to emergency 

room for any new or worsening symptoms.


Critical care attestation.: 


If time is entered above; I have spent that time in minutes in the direct care 

of this critically ill patient, excluding procedure time.








ED Disposition


Clinical Impression: 


 COVID-19, SOB (shortness of breath)





Disposition: 01 HOME / SELF CARE / HOMELESS


Is pt being admited?: No


Does the pt Need Aspirin: No


Condition: Stable


Instructions:  COVID-19


Additional Instructions: 


Please follow-up with your primary care doctor.  Please follow-up with the 

orthopedic doctor you were given by [facility].  Return to emergency room 

for any new or worsening symptoms.


Referrals: 


PRIMARY CARE,MD [Primary Care Provider] - 2-3 Days


your, orthopedic doctor [Other] - 2-3 Days


Time of Disposition: 13:50


Print Language: ENGLISH

## 2021-09-13 NOTE — ELECTROCARDIOGRAPH REPORT
[facility]

                                       

Test Date:    2021               Test Time:    10:30:49

Pat Name:     ISAI SANCHEZ         Department:   

Patient ID:   [id number]          Room:          

Gender:       F                        Technician:   

:          [id number]               Requested By: SHELLY PICKETT

Order Number: [id number]              Reading MD:   Seferino Vergara

                                 Measurements

Intervals                              Axis          

Rate:         68                       P:            72

UT:           158                      QRS:          85

QRSD:         82                       T:            88

QT:           434                                    

QTc:          461                                    

                           Interpretive Statements

Sinus rhythm

Abnrm T, consider ischemia, anterolateral lds

No previous ECG available for comparison

Electronically Signed On 2021 9:43:25 EDT by Seferino Vergara

## 2023-04-25 ENCOUNTER — OFFICE VISIT (OUTPATIENT)
Dept: FAMILY MEDICINE CLINIC | Facility: CLINIC | Age: 38
End: 2023-04-25

## 2023-04-25 VITALS
HEART RATE: 88 BPM | WEIGHT: 195 LBS | BODY MASS INDEX: 30.61 KG/M2 | OXYGEN SATURATION: 97 % | TEMPERATURE: 98.4 F | HEIGHT: 67 IN | SYSTOLIC BLOOD PRESSURE: 100 MMHG | DIASTOLIC BLOOD PRESSURE: 66 MMHG

## 2023-04-25 DIAGNOSIS — Z12.4 ENCOUNTER FOR SCREENING FOR CERVICAL CANCER: ICD-10-CM

## 2023-04-25 DIAGNOSIS — Z79.899 MEDICATION MANAGEMENT: ICD-10-CM

## 2023-04-25 DIAGNOSIS — Z87.81 HISTORY OF HUMERUS FRACTURE: ICD-10-CM

## 2023-04-25 DIAGNOSIS — G89.29 CHRONIC RIGHT SHOULDER PAIN: ICD-10-CM

## 2023-04-25 DIAGNOSIS — Z13.220 ENCOUNTER FOR SCREENING FOR LIPID DISORDER: ICD-10-CM

## 2023-04-25 DIAGNOSIS — Z00.00 ENCNTR FOR GENERAL ADULT MEDICAL EXAM W/O ABNORMAL FINDINGS: ICD-10-CM

## 2023-04-25 DIAGNOSIS — R53.83 FATIGUE, UNSPECIFIED TYPE: ICD-10-CM

## 2023-04-25 DIAGNOSIS — M25.511 CHRONIC RIGHT SHOULDER PAIN: ICD-10-CM

## 2023-04-25 DIAGNOSIS — R53.83 FATIGUE, UNSPECIFIED TYPE: Primary | ICD-10-CM

## 2023-04-25 LAB
BASOPHILS # BLD: 0 K/UL (ref 0–0.2)
BASOPHILS NFR BLD: 1 % (ref 0–2)
DIFFERENTIAL METHOD BLD: NORMAL
EOSINOPHIL # BLD: 0.2 K/UL (ref 0–0.8)
EOSINOPHIL NFR BLD: 4 % (ref 0.5–7.8)
ERYTHROCYTE [DISTWIDTH] IN BLOOD BY AUTOMATED COUNT: 12.9 % (ref 11.9–14.6)
HCT VFR BLD AUTO: 41.5 % (ref 35.8–46.3)
HGB BLD-MCNC: 14.3 G/DL (ref 11.7–15.4)
IMM GRANULOCYTES # BLD AUTO: 0 K/UL (ref 0–0.5)
IMM GRANULOCYTES NFR BLD AUTO: 0 % (ref 0–5)
LYMPHOCYTES # BLD: 1.7 K/UL (ref 0.5–4.6)
LYMPHOCYTES NFR BLD: 32 % (ref 13–44)
MCH RBC QN AUTO: 28.9 PG (ref 26.1–32.9)
MCHC RBC AUTO-ENTMCNC: 34.5 G/DL (ref 31.4–35)
MCV RBC AUTO: 84 FL (ref 82–102)
MONOCYTES # BLD: 0.5 K/UL (ref 0.1–1.3)
MONOCYTES NFR BLD: 10 % (ref 4–12)
NEUTS SEG # BLD: 2.9 K/UL (ref 1.7–8.2)
NEUTS SEG NFR BLD: 53 % (ref 43–78)
NRBC # BLD: 0 K/UL (ref 0–0.2)
PLATELET # BLD AUTO: 299 K/UL (ref 150–450)
PMV BLD AUTO: 10.7 FL (ref 9.4–12.3)
RBC # BLD AUTO: 4.94 M/UL (ref 4.05–5.2)
WBC # BLD AUTO: 5.3 K/UL (ref 4.3–11.1)

## 2023-04-25 RX ORDER — HYDROXYZINE HYDROCHLORIDE 25 MG/1
25 TABLET, FILM COATED ORAL EVERY 6 HOURS PRN
COMMUNITY
Start: 2022-06-26

## 2023-04-25 RX ORDER — QUETIAPINE FUMARATE 50 MG/1
50 TABLET, FILM COATED ORAL DAILY
COMMUNITY
Start: 2023-04-10

## 2023-04-25 RX ORDER — QUETIAPINE FUMARATE 100 MG/1
TABLET, FILM COATED ORAL
COMMUNITY
Start: 2023-04-23

## 2023-04-25 SDOH — ECONOMIC STABILITY: FOOD INSECURITY: WITHIN THE PAST 12 MONTHS, THE FOOD YOU BOUGHT JUST DIDN'T LAST AND YOU DIDN'T HAVE MONEY TO GET MORE.: NEVER TRUE

## 2023-04-25 SDOH — ECONOMIC STABILITY: INCOME INSECURITY: HOW HARD IS IT FOR YOU TO PAY FOR THE VERY BASICS LIKE FOOD, HOUSING, MEDICAL CARE, AND HEATING?: NOT HARD AT ALL

## 2023-04-25 SDOH — ECONOMIC STABILITY: FOOD INSECURITY: WITHIN THE PAST 12 MONTHS, YOU WORRIED THAT YOUR FOOD WOULD RUN OUT BEFORE YOU GOT MONEY TO BUY MORE.: NEVER TRUE

## 2023-04-25 SDOH — ECONOMIC STABILITY: HOUSING INSECURITY
IN THE LAST 12 MONTHS, WAS THERE A TIME WHEN YOU DID NOT HAVE A STEADY PLACE TO SLEEP OR SLEPT IN A SHELTER (INCLUDING NOW)?: NO

## 2023-04-25 ASSESSMENT — ENCOUNTER SYMPTOMS
SHORTNESS OF BREATH: 1
WHEEZING: 0
EYES NEGATIVE: 1
BACK PAIN: 1
GASTROINTESTINAL NEGATIVE: 1
COUGH: 0

## 2023-04-25 ASSESSMENT — PATIENT HEALTH QUESTIONNAIRE - PHQ9
SUM OF ALL RESPONSES TO PHQ QUESTIONS 1-9: 2
2. FEELING DOWN, DEPRESSED OR HOPELESS: 1
SUM OF ALL RESPONSES TO PHQ9 QUESTIONS 1 & 2: 2
SUM OF ALL RESPONSES TO PHQ QUESTIONS 1-9: 2
1. LITTLE INTEREST OR PLEASURE IN DOING THINGS: 1

## 2023-04-25 NOTE — PROGRESS NOTES
Normocephalic and atraumatic. Right Ear: External ear normal.      Left Ear: External ear normal.      Nose: Nose normal.      Mouth/Throat:      Mouth: Mucous membranes are moist.      Comments: Mild irritation to uvula region. Eyes:      Extraocular Movements: Extraocular movements intact. Conjunctiva/sclera: Conjunctivae normal.      Pupils: Pupils are equal, round, and reactive to light. Neck:      Vascular: No carotid bruit. Cardiovascular:      Rate and Rhythm: Normal rate and regular rhythm. Pulses: Normal pulses. Heart sounds: Normal heart sounds. Pulmonary:      Effort: Pulmonary effort is normal.      Breath sounds: Normal breath sounds. Abdominal:      General: Bowel sounds are normal. There is no distension. Palpations: Abdomen is soft. There is no mass. Tenderness: There is no abdominal tenderness. Hernia: No hernia is present. Musculoskeletal:         General: No tenderness or deformity. Normal range of motion. Cervical back: Normal range of motion and neck supple. Right lower leg: No edema. Left lower leg: No edema. Comments: Right shoulder impingement with ROM. Lymphadenopathy:      Cervical: No cervical adenopathy. Skin:     General: Skin is warm and dry. Coloration: Skin is not jaundiced or pale. Neurological:      General: No focal deficit present. Mental Status: She is alert and oriented to person, place, and time. Psychiatric:         Mood and Affect: Mood normal.         Behavior: Behavior normal.         Thought Content: Thought content normal.         Judgment: Judgment normal.        1. Fatigue, unspecified type  -     TSH with Reflex; Future  2. Encounter for screening for lipid disorder  -     Lipid Panel; Future  3. Medication management  -     Comprehensive Metabolic Panel; Future  -     CBC with Auto Differential; Future  4.  Encntr for general adult medical exam w/o abnormal findings  -     Comprehensive

## 2023-04-26 LAB
ALBUMIN SERPL-MCNC: 3.7 G/DL (ref 3.5–5)
ALBUMIN/GLOB SERPL: 0.9 (ref 0.4–1.6)
ALP SERPL-CCNC: 104 U/L (ref 50–136)
ALT SERPL-CCNC: 33 U/L (ref 12–65)
ANION GAP SERPL CALC-SCNC: 6 MMOL/L (ref 2–11)
AST SERPL-CCNC: 27 U/L (ref 15–37)
BILIRUB SERPL-MCNC: 0.4 MG/DL (ref 0.2–1.1)
BUN SERPL-MCNC: 13 MG/DL (ref 6–23)
CALCIUM SERPL-MCNC: 9.8 MG/DL (ref 8.3–10.4)
CHLORIDE SERPL-SCNC: 107 MMOL/L (ref 101–110)
CHOLEST SERPL-MCNC: 187 MG/DL
CO2 SERPL-SCNC: 24 MMOL/L (ref 21–32)
CREAT SERPL-MCNC: 0.7 MG/DL (ref 0.6–1)
GLOBULIN SER CALC-MCNC: 3.9 G/DL (ref 2.8–4.5)
GLUCOSE SERPL-MCNC: 97 MG/DL (ref 65–100)
HDLC SERPL-MCNC: 38 MG/DL (ref 40–60)
HDLC SERPL: 4.9
LDLC SERPL CALC-MCNC: 90.4 MG/DL
POTASSIUM SERPL-SCNC: 4.1 MMOL/L (ref 3.5–5.1)
PROT SERPL-MCNC: 7.6 G/DL (ref 6.3–8.2)
SODIUM SERPL-SCNC: 137 MMOL/L (ref 133–143)
T3 SERPL-MCNC: 1.97 NG/ML (ref 0.6–1.81)
T4 FREE SERPL-MCNC: 1.4 NG/DL (ref 0.78–1.46)
TRIGL SERPL-MCNC: 293 MG/DL (ref 35–150)
TSH W FREE THYROID IF ABNORMAL: <0.01 UIU/ML (ref 0.36–3.74)
VLDLC SERPL CALC-MCNC: 58.6 MG/DL (ref 6–23)

## 2023-05-01 DIAGNOSIS — E05.90 HYPERTHYROIDISM: ICD-10-CM

## 2023-05-01 DIAGNOSIS — R94.6 ABNORMAL THYROID FUNCTION TEST: Primary | ICD-10-CM
